# Patient Record
Sex: MALE | URBAN - METROPOLITAN AREA
[De-identification: names, ages, dates, MRNs, and addresses within clinical notes are randomized per-mention and may not be internally consistent; named-entity substitution may affect disease eponyms.]

---

## 2020-01-07 ENCOUNTER — APPOINTMENT (RX ONLY)
Dept: URBAN - METROPOLITAN AREA CLINIC 44 | Facility: CLINIC | Age: 25
Setting detail: DERMATOLOGY
End: 2020-01-07

## 2020-01-07 ENCOUNTER — RX ONLY (OUTPATIENT)
Age: 25
Setting detail: RX ONLY
End: 2020-01-07

## 2020-01-07 DIAGNOSIS — L40.8 OTHER PSORIASIS: ICD-10-CM | Status: INADEQUATELY CONTROLLED

## 2020-01-07 PROCEDURE — ? FULL BODY SKIN EXAM - DECLINED

## 2020-01-07 PROCEDURE — ? DEFER

## 2020-01-07 PROCEDURE — ? MEDICATION COUNSELING

## 2020-01-07 PROCEDURE — ? ADDITIONAL NOTES

## 2020-01-07 PROCEDURE — 99202 OFFICE O/P NEW SF 15 MIN: CPT

## 2020-01-07 PROCEDURE — ? PRESCRIPTION

## 2020-01-07 PROCEDURE — ? COUNSELING

## 2020-01-07 RX ORDER — KETOCONAZOLE 20 MG/ML
SHAMPOO TOPICAL AS DIRECTED
Qty: 1 | Refills: 3 | Status: ERX

## 2020-01-07 RX ORDER — KETOCONAZOLE 20 MG/ML
SHAMPOO TOPICAL AS DIRECTED
Qty: 1 | Refills: 3 | Status: ERX | COMMUNITY
Start: 2020-01-07

## 2020-01-07 RX ADMIN — KETOCONAZOLE: 20 SHAMPOO TOPICAL at 00:00

## 2020-01-07 ASSESSMENT — LOCATION DETAILED DESCRIPTION DERM: LOCATION DETAILED: RIGHT LATERAL FRONTAL SCALP

## 2020-01-07 ASSESSMENT — LOCATION ZONE DERM: LOCATION ZONE: SCALP

## 2020-01-07 ASSESSMENT — LOCATION SIMPLE DESCRIPTION DERM: LOCATION SIMPLE: RIGHT SCALP

## 2020-01-07 NOTE — HPI: RASH (DANDRUFF)
How Severe Is It?: mild
Is This A New Presentation, Or A Follow-Up?: Dandruff
Additional History: New pt. States nizeral shampoo worked better to aa of scalp, but discontinued use. Now pt is only using head and shoulders

## 2020-01-07 NOTE — PROCEDURE: MEDICATION COUNSELING
Xelanthonyz Pregnancy And Lactation Text: This medication is Pregnancy Category D and is not considered safe during pregnancy.  The risk during breast feeding is also uncertain.

## 2020-01-07 NOTE — PROCEDURE: DEFER
Introduction Text (Please End With A Colon): The following procedure was deferred: shave biopsy to r/o SCC. pt is having heart sugery next week and cardiologist wants pt to perform shave biopsy after surgery to not interfere with clearance of surgery.
Procedure To Be Performed At Next Visit: Biopsy by shave method
Detail Level: Detailed

## 2020-01-07 NOTE — PROCEDURE: ADDITIONAL NOTES
Additional Notes: will f/u in 4-6 weeks. If does not resolve, will biopsy to rule out psoriasis
Detail Level: Simple

## 2022-01-03 ENCOUNTER — WEB ENCOUNTER (OUTPATIENT)
Dept: URBAN - METROPOLITAN AREA CLINIC 54 | Facility: CLINIC | Age: 27
End: 2022-01-03

## 2022-01-03 ENCOUNTER — DASHBOARD ENCOUNTERS (OUTPATIENT)
Age: 27
End: 2022-01-03

## 2022-01-03 ENCOUNTER — TELEPHONE ENCOUNTER (OUTPATIENT)
Dept: URBAN - METROPOLITAN AREA CLINIC 40 | Facility: CLINIC | Age: 27
End: 2022-01-03

## 2022-01-03 ENCOUNTER — OFFICE VISIT (OUTPATIENT)
Dept: URBAN - METROPOLITAN AREA CLINIC 54 | Facility: CLINIC | Age: 27
End: 2022-01-03
Payer: MEDICAID

## 2022-01-03 VITALS
TEMPERATURE: 98.1 F | DIASTOLIC BLOOD PRESSURE: 102 MMHG | HEIGHT: 65 IN | SYSTOLIC BLOOD PRESSURE: 155 MMHG | BODY MASS INDEX: 32.06 KG/M2 | HEART RATE: 82 BPM | WEIGHT: 192.4 LBS

## 2022-01-03 DIAGNOSIS — K59.09 CHRONIC CONSTIPATION: ICD-10-CM

## 2022-01-03 PROCEDURE — 99204 OFFICE O/P NEW MOD 45 MIN: CPT | Performed by: INTERNAL MEDICINE

## 2022-01-03 RX ORDER — BISACODYL 5 MG
1 TABLET AS NEEDED TABLET, DELAYED RELEASE (ENTERIC COATED) ORAL ONCE A DAY
Status: ACTIVE | COMMUNITY

## 2022-01-03 NOTE — HPI-TODAY'S VISIT:
Patient is a 27 yo man self referred for above reasons. He c/o constipation x 2 years. His symptoms come and go. He is most bothered by reduced frequency (Brown 1-5). He has occasional straining with incomplete evacuation. He has bloating without distention. He denies abdominal pain or rectal bleeding. He is taking Dulcolax to affect a BM. No fever, chills, abnormal weight loss. He has never had a colonoscopy or pelvic surgery. No back problems. No use of NSAID's. Miralax does not help.

## 2022-01-04 LAB
A/G RATIO: 1.8
ALBUMIN: 4.9
ALKALINE PHOSPHATASE: 80
ALT (SGPT): 31
AST (SGOT): 18
BILIRUBIN, TOTAL: 0.4
BUN/CREATININE RATIO: 12
BUN: 9
CALCIUM: 9.6
CARBON DIOXIDE, TOTAL: 24
CHLORIDE: 100
CREATININE: 0.77
EGFR IF AFRICN AM: 146
EGFR IF NONAFRICN AM: 126
GLOBULIN, TOTAL: 2.7
GLUCOSE: 96
HEMATOCRIT: 47
HEMOGLOBIN: 15.2
MCH: 29.9
MCHC: 32.3
MCV: 92
NRBC: (no result)
PLATELETS: 359
POTASSIUM: 4.2
PROTEIN, TOTAL: 7.6
RBC: 5.09
RDW: 12.6
SODIUM: 138
T4,FREE(DIRECT): 1.3
TSH: 3.67
WBC: 6.9